# Patient Record
Sex: MALE | NOT HISPANIC OR LATINO | URBAN - METROPOLITAN AREA
[De-identification: names, ages, dates, MRNs, and addresses within clinical notes are randomized per-mention and may not be internally consistent; named-entity substitution may affect disease eponyms.]

---

## 2023-12-11 ENCOUNTER — TELEPHONE (OUTPATIENT)
Dept: PRIMARY CARE | Facility: CLINIC | Age: 48
End: 2023-12-11

## 2023-12-11 DIAGNOSIS — K64.9 HEMORRHOIDS, UNSPECIFIED HEMORRHOID TYPE: ICD-10-CM

## 2023-12-11 NOTE — TELEPHONE ENCOUNTER
Pt states he had a hemorrhoid burst 12/9/23 and still has blood when wipes.  Has been using creams and keeping clean.  Requesting appt for further evaluation.  Please advise.  Ph: [phone] at work

## 2023-12-11 NOTE — TELEPHONE ENCOUNTER
Pt does not have a Gastro MD and requesting referral.  Please advise.      Please send back so can provide pt with contact information.

## 2025-01-31 ENCOUNTER — TELEPHONE (OUTPATIENT)
Dept: PRIMARY CARE | Facility: CLINIC | Age: 50
End: 2025-01-31
Payer: COMMERCIAL

## 2025-01-31 NOTE — TELEPHONE ENCOUNTER
I attempted to reach pt on both home and mobile numbers. Straight to VM on both numbers. LM for a call back.

## 2025-01-31 NOTE — TELEPHONE ENCOUNTER
Pt c/o R leg swelling behind knee, red, warm and itcy x 4 days. Appt made for Tue 2/4.   Ok to wait for appt?

## 2025-02-04 ENCOUNTER — APPOINTMENT (OUTPATIENT)
Dept: PRIMARY CARE | Facility: CLINIC | Age: 50
End: 2025-02-04
Payer: COMMERCIAL